# Patient Record
Sex: FEMALE | URBAN - NONMETROPOLITAN AREA
[De-identification: names, ages, dates, MRNs, and addresses within clinical notes are randomized per-mention and may not be internally consistent; named-entity substitution may affect disease eponyms.]

---

## 2024-09-19 ENCOUNTER — PREP FOR PROCEDURE (OUTPATIENT)
Dept: OPERATING ROOM | Facility: HOSPITAL | Age: 69
End: 2024-09-19

## 2024-09-19 DIAGNOSIS — H25.811 COMBINED FORMS OF AGE-RELATED CATARACT OF RIGHT EYE: Primary | ICD-10-CM

## 2024-09-19 RX ORDER — PREDNISOLONE ACETATE 10 MG/ML
1 SUSPENSION/ DROPS OPHTHALMIC ONCE
OUTPATIENT
Start: 2024-09-19 | End: 2024-09-19

## 2024-09-19 RX ORDER — POVIDONE-IODINE 5 %
SOLUTION, NON-ORAL OPHTHALMIC (EYE) ONCE
OUTPATIENT
Start: 2024-09-19 | End: 2024-09-19

## 2024-09-19 RX ORDER — TETRACAINE HYDROCHLORIDE 5 MG/ML
1 SOLUTION OPHTHALMIC ONCE
OUTPATIENT
Start: 2024-09-19 | End: 2024-09-19

## 2024-09-19 RX ORDER — SODIUM CHLORIDE, SODIUM LACTATE, POTASSIUM CHLORIDE, CALCIUM CHLORIDE 600; 310; 30; 20 MG/100ML; MG/100ML; MG/100ML; MG/100ML
20 INJECTION, SOLUTION INTRAVENOUS CONTINUOUS
OUTPATIENT
Start: 2024-09-19

## 2024-09-19 RX ORDER — KETOROLAC TROMETHAMINE 5 MG/ML
1 SOLUTION OPHTHALMIC
OUTPATIENT
Start: 2024-09-19 | End: 2024-09-19

## 2024-09-25 ENCOUNTER — ANESTHESIA EVENT (OUTPATIENT)
Dept: OPERATING ROOM | Facility: HOSPITAL | Age: 69
End: 2024-09-25
Payer: MEDICARE

## 2024-09-26 ENCOUNTER — ANESTHESIA (OUTPATIENT)
Dept: OPERATING ROOM | Facility: HOSPITAL | Age: 69
End: 2024-09-26
Payer: MEDICARE

## 2024-09-26 ENCOUNTER — HOSPITAL ENCOUNTER (OUTPATIENT)
Facility: HOSPITAL | Age: 69
Setting detail: OUTPATIENT SURGERY
Discharge: HOME | End: 2024-09-26
Attending: OPHTHALMOLOGY | Admitting: OPHTHALMOLOGY
Payer: MEDICARE

## 2024-09-26 VITALS
RESPIRATION RATE: 18 BRPM | BODY MASS INDEX: 40.47 KG/M2 | DIASTOLIC BLOOD PRESSURE: 57 MMHG | HEART RATE: 66 BPM | WEIGHT: 206.13 LBS | SYSTOLIC BLOOD PRESSURE: 145 MMHG | OXYGEN SATURATION: 95 % | HEIGHT: 60 IN | TEMPERATURE: 96.8 F

## 2024-09-26 PROBLEM — E66.9 OBESITY: Status: ACTIVE | Noted: 2024-09-26

## 2024-09-26 PROBLEM — E03.9 HYPOTHYROIDISM: Status: ACTIVE | Noted: 2024-09-26

## 2024-09-26 PROBLEM — I25.10 CAD (CORONARY ARTERY DISEASE): Status: ACTIVE | Noted: 2024-09-26

## 2024-09-26 PROBLEM — Z95.5 STENTED CORONARY ARTERY: Status: ACTIVE | Noted: 2024-09-26

## 2024-09-26 PROCEDURE — 2780000003 HC OR 278 NO HCPCS: Performed by: OPHTHALMOLOGY

## 2024-09-26 PROCEDURE — 3700000002 HC GENERAL ANESTHESIA TIME - EACH INCREMENTAL 1 MINUTE: Performed by: OPHTHALMOLOGY

## 2024-09-26 PROCEDURE — 2500000005 HC RX 250 GENERAL PHARMACY W/O HCPCS: Performed by: OPHTHALMOLOGY

## 2024-09-26 PROCEDURE — 2500000005 HC RX 250 GENERAL PHARMACY W/O HCPCS: Performed by: ANESTHESIOLOGY

## 2024-09-26 PROCEDURE — 2500000001 HC RX 250 WO HCPCS SELF ADMINISTERED DRUGS (ALT 637 FOR MEDICARE OP): Performed by: OPHTHALMOLOGY

## 2024-09-26 PROCEDURE — 2500000004 HC RX 250 GENERAL PHARMACY W/ HCPCS (ALT 636 FOR OP/ED): Performed by: ANESTHESIOLOGY

## 2024-09-26 PROCEDURE — 2720000007 HC OR 272 NO HCPCS: Performed by: OPHTHALMOLOGY

## 2024-09-26 PROCEDURE — 7100000010 HC PHASE TWO TIME - EACH INCREMENTAL 1 MINUTE: Performed by: OPHTHALMOLOGY

## 2024-09-26 PROCEDURE — 2500000004 HC RX 250 GENERAL PHARMACY W/ HCPCS (ALT 636 FOR OP/ED): Performed by: OPHTHALMOLOGY

## 2024-09-26 PROCEDURE — 7100000009 HC PHASE TWO TIME - INITIAL BASE CHARGE: Performed by: OPHTHALMOLOGY

## 2024-09-26 PROCEDURE — 3600000003 HC OR TIME - INITIAL BASE CHARGE - PROCEDURE LEVEL THREE: Performed by: OPHTHALMOLOGY

## 2024-09-26 PROCEDURE — 3700000001 HC GENERAL ANESTHESIA TIME - INITIAL BASE CHARGE: Performed by: OPHTHALMOLOGY

## 2024-09-26 PROCEDURE — 3600000008 HC OR TIME - EACH INCREMENTAL 1 MINUTE - PROCEDURE LEVEL THREE: Performed by: OPHTHALMOLOGY

## 2024-09-26 DEVICE — STERILE UV ABSORBING HYDROPHOBIC ACRYLIC FOLDABLE ASPHERIC POSTERIOR CHAMBER INTRAOCULAR LENS WITH THE AUTONOME™ AUTOMATED PRE-LOADED DELIVERY SYSTEM
Type: IMPLANTABLE DEVICE | Site: EYE | Status: FUNCTIONAL
Brand: CLAREON™

## 2024-09-26 RX ORDER — PREDNISOLONE ACETATE 10 MG/ML
1 SUSPENSION/ DROPS OPHTHALMIC ONCE
Status: COMPLETED | OUTPATIENT
Start: 2024-09-26 | End: 2024-09-26

## 2024-09-26 RX ORDER — TETRACAINE HYDROCHLORIDE 5 MG/ML
1 SOLUTION OPHTHALMIC ONCE
Status: COMPLETED | OUTPATIENT
Start: 2024-09-26 | End: 2024-09-26

## 2024-09-26 RX ORDER — LIDOCAINE HYDROCHLORIDE 10 MG/ML
INJECTION, SOLUTION EPIDURAL; INFILTRATION; INTRACAUDAL; PERINEURAL AS NEEDED
Status: DISCONTINUED | OUTPATIENT
Start: 2024-09-26 | End: 2024-09-26

## 2024-09-26 RX ORDER — PROPOFOL 10 MG/ML
INJECTION, EMULSION INTRAVENOUS AS NEEDED
Status: DISCONTINUED | OUTPATIENT
Start: 2024-09-26 | End: 2024-09-26

## 2024-09-26 RX ORDER — POVIDONE-IODINE 5 %
SOLUTION, NON-ORAL OPHTHALMIC (EYE) ONCE
Status: COMPLETED | OUTPATIENT
Start: 2024-09-26 | End: 2024-09-26

## 2024-09-26 RX ORDER — SODIUM CHLORIDE, SODIUM LACTATE, POTASSIUM CHLORIDE, CALCIUM CHLORIDE 600; 310; 30; 20 MG/100ML; MG/100ML; MG/100ML; MG/100ML
20 INJECTION, SOLUTION INTRAVENOUS CONTINUOUS
Status: DISCONTINUED | OUTPATIENT
Start: 2024-09-26 | End: 2024-09-26 | Stop reason: HOSPADM

## 2024-09-26 RX ORDER — ASPIRIN 81 MG/1
81 TABLET ORAL DAILY
COMMUNITY

## 2024-09-26 RX ORDER — SODIUM CHLORIDE, SODIUM LACTATE, POTASSIUM CHLORIDE, CALCIUM CHLORIDE 600; 310; 30; 20 MG/100ML; MG/100ML; MG/100ML; MG/100ML
100 INJECTION, SOLUTION INTRAVENOUS CONTINUOUS
Status: DISCONTINUED | OUTPATIENT
Start: 2024-09-26 | End: 2024-09-26 | Stop reason: HOSPADM

## 2024-09-26 RX ORDER — MIDAZOLAM HYDROCHLORIDE 1 MG/ML
INJECTION INTRAMUSCULAR; INTRAVENOUS AS NEEDED
Status: DISCONTINUED | OUTPATIENT
Start: 2024-09-26 | End: 2024-09-26

## 2024-09-26 RX ORDER — KETOROLAC TROMETHAMINE 5 MG/ML
1 SOLUTION OPHTHALMIC
Status: COMPLETED | OUTPATIENT
Start: 2024-09-26 | End: 2024-09-26

## 2024-09-26 RX ORDER — LIDOCAINE HYDROCHLORIDE AND EPINEPHRINE 10; 10 MG/ML; UG/ML
INJECTION, SOLUTION INFILTRATION; PERINEURAL AS NEEDED
Status: DISCONTINUED | OUTPATIENT
Start: 2024-09-26 | End: 2024-09-26 | Stop reason: HOSPADM

## 2024-09-26 RX ORDER — LEVOTHYROXINE SODIUM 25 UG/1
25 TABLET ORAL DAILY
COMMUNITY

## 2024-09-26 RX ADMIN — KETOROLAC TROMETHAMINE 1 DROP: 5 SOLUTION/ DROPS OPHTHALMIC at 09:01

## 2024-09-26 RX ADMIN — POLYVINYL ALCOHOL, POVIDONE 1 DROP: 14; 6 SOLUTION/ DROPS OPHTHALMIC at 08:47

## 2024-09-26 RX ADMIN — PHENYLEPHRINE HYDROCHLORIDE 1 DROP: 100 SOLUTION/ DROPS OPHTHALMIC at 08:46

## 2024-09-26 RX ADMIN — KETOROLAC TROMETHAMINE 1 DROP: 5 SOLUTION/ DROPS OPHTHALMIC at 08:30

## 2024-09-26 RX ADMIN — POLYVINYL ALCOHOL, POVIDONE 1 DROP: 14; 6 SOLUTION/ DROPS OPHTHALMIC at 08:41

## 2024-09-26 RX ADMIN — KETOROLAC TROMETHAMINE 1 DROP: 5 SOLUTION/ DROPS OPHTHALMIC at 08:41

## 2024-09-26 RX ADMIN — PHENYLEPHRINE HYDROCHLORIDE 1 DROP: 100 SOLUTION/ DROPS OPHTHALMIC at 09:01

## 2024-09-26 RX ADMIN — POLYVINYL ALCOHOL, POVIDONE 1 DROP: 14; 6 SOLUTION/ DROPS OPHTHALMIC at 08:30

## 2024-09-26 RX ADMIN — SODIUM CHLORIDE, POTASSIUM CHLORIDE, SODIUM LACTATE AND CALCIUM CHLORIDE 20 ML/HR: 600; 310; 30; 20 INJECTION, SOLUTION INTRAVENOUS at 08:00

## 2024-09-26 RX ADMIN — POVIDONE-IODINE 1 APPLICATION: 5 SOLUTION OPHTHALMIC at 08:31

## 2024-09-26 RX ADMIN — POLYVINYL ALCOHOL, POVIDONE 1 DROP: 14; 6 SOLUTION/ DROPS OPHTHALMIC at 09:01

## 2024-09-26 RX ADMIN — PHENYLEPHRINE HYDROCHLORIDE 1 DROP: 100 SOLUTION/ DROPS OPHTHALMIC at 08:30

## 2024-09-26 RX ADMIN — TETRACAINE HYDROCHLORIDE 1 DROP: 5 SOLUTION OPHTHALMIC at 08:30

## 2024-09-26 RX ADMIN — PHENYLEPHRINE HYDROCHLORIDE 1 DROP: 100 SOLUTION/ DROPS OPHTHALMIC at 08:41

## 2024-09-26 RX ADMIN — KETOROLAC TROMETHAMINE 1 DROP: 5 SOLUTION/ DROPS OPHTHALMIC at 08:46

## 2024-09-26 SDOH — HEALTH STABILITY: MENTAL HEALTH: CURRENT SMOKER: 0

## 2024-09-26 ASSESSMENT — COLUMBIA-SUICIDE SEVERITY RATING SCALE - C-SSRS
2. HAVE YOU ACTUALLY HAD ANY THOUGHTS OF KILLING YOURSELF?: NO
1. IN THE PAST MONTH, HAVE YOU WISHED YOU WERE DEAD OR WISHED YOU COULD GO TO SLEEP AND NOT WAKE UP?: NO
6. HAVE YOU EVER DONE ANYTHING, STARTED TO DO ANYTHING, OR PREPARED TO DO ANYTHING TO END YOUR LIFE?: NO

## 2024-09-26 ASSESSMENT — PAIN - FUNCTIONAL ASSESSMENT: PAIN_FUNCTIONAL_ASSESSMENT: 0-10

## 2024-09-26 NOTE — DISCHARGE INSTRUCTIONS
Please see enclosed instructions from Dr. Ponce regarding eye drop schedule, restrictions and use of eye shield.    Please take bag with eye drops that were given to you today as well as ALL eye drops that you are using at home with you to your appointment tomorrow at Dr. Ponce's office.

## 2024-09-26 NOTE — H&P
H&P Notes  - documented in this encounter   Cristino Ponce MD - 09/16/2024 2:53 PM EDT  Formatting of this note is different from the original.  HISTORY AND PHYSICAL EXAMINATION    SERVICE DATE: 9/16/2024  SERVICE TIME: 2:53 PM    PRIMARY CARE PHYSICIAN: Guido Skaggs MD    REASON FOR VISIT:  Ashley Yeung is a 69 year old female who is being seen for Combined form age related cataract right eye    The patient has the following:  ACTIVE PROBLEM LIST  Hypothyroidism, Acquired  Chronic Right-Sided Low Back Pain Without Sciatica  Dean (Dyspnea On Exertion)  Chronic Pain of Right Hip  Pilar Cyst of Scalp  History of Coronary Artery Stent Placement  Macular Hole of Right Eye  Vitreomacular Traction, Left  Cortical Cataract of Both Eyes  Cataract, Nuclear Sclerotic, Both Eyes  Language Barrier Affecting Health Care  Coronary Artery Disease Involving Native Coronary Artery of Native Heart Without Angina Pectoris  Obesity, Class III, BMI >= 40  Lumbar Facet Arthropathy  Combined Form of Age-Related Cataract, Right Eye    SUBJECTIVE  CHIEF COMPLAINT: Combined form age related cataract right eye  Associated symptoms: Blurry vision, difficulty reading small print, difficulty watching television    PAST MEDICAL HISTORY  Diagnosis Date  Coronary artery disease  DEAN (dyspnea on exertion)  History of coronary artery stent placement  HLD (hyperlipidemia)  Hypothyroidism    PAST SURGICAL HISTORY  Procedure Laterality Date  PATIENT HAS A CORONARY ARTERY STENT    History reviewed. No pertinent family history.  SOCIAL HISTORY:  Social History    Tobacco Use  Smoking status: Never  Passive exposure: Never  Smokeless tobacco: Never  Vaping Use  Vaping status: Never Used  Substance Use Topics  Alcohol use: Never  Drug use: Never    MEDICATIONS:  Prior to Admission medications as of 9/16/24 1446  Medication Sig Last Dose Taking  levothyroxine (SYNTHROID) 50 mcg tablet Take 1 tablet by mouth once daily. Taking Yes  aspirin, enteric  coated (ADULT LOW DOSE ASPIRIN) 81 mg EC tablet Take 1 tablet by mouth once daily. Taking Yes  atorvastatin (LIPITOR) 20 mg tablet Take 1 tablet by mouth once daily. Taking Yes  fluorometholone (FML LIQUID FILM) 0.1 % ophthalmic suspension Use 1 Drop in both eyes three times a day.    No medication comments found.    CURRENT ALLERGIES: ALLERGIES  No Known Allergies    REVIEW OF SYSTEMS:  PAIN ASSESSMENT:  General: No weight loss, malaise or fevers.  Neuro: No Hx of stroke or seizures  Respiratory: No history of current cough or dyspnea, or pneumonia in the past 6 weeks. No history of respiratory/pulmonary symptoms or problems  Cardiovascular: CAD  GI: No history of GI symptoms or problems. No history of esophageal varices, recent ascites, or ETOH greater than 2 drinks per day.  : No history of UTI in past 6 weeks. No history of renal failure. Not currently on or requiring dialysis. No history of  symptoms or problems.  GYN: Negative for abnormal vaginal bleeding, abnormal vaginal discharge.  Pregnancy: Denies  Endocrine: No history of diabetes. Has not taken steroids within the past 30 days. No history of endocrinological symptoms or problems.  Hematology: No history of bleeding or clotting disorder. Pt is not taking anti-coagulation or platelet medications. No history of hematological symptoms or problems.  Oncology: No history of CA metastasis, chemo within 30 days, or radiotherapy within 90 days. Has not lost 10% of body wt in 6 months. No history of oncological symptoms or problems.  Psych: No history of psychiatric symptoms or problems.  Musculoskeletal: Negative for joint pain or swelling, back pain or muscle pain.  Skin: Negative for lesions, rash and itching.    PHYSICAL EXAM:  VITALS:  /72  Pulse 62        General: Alert and oriented  Skin: Normal color, no rash, no lesions.  HEENT: EOM, pupils equal, round and reactive.  Cardiovascular: Normal S1 & S2, no rubs, murmurs or gallops. No JVD. Pulse  regular.  Lungs: Normal breath sounds, no wheezes or crackles.  Abdomen: Soft, non-tender, no rigidity.  Extremities: No deformity, no edema or tenderness, no joint swelling or clubbing.  Neurological: Normal cognition and motor skills.  Pulses: Carotid and radial pulses normal +2.    Diagnostic tests reviewed for today's visit:  No new labs or tests    ASSESSMENT  Medication and Non-Pharmacologic VTE Prophylaxis/Anticoagulants      VTE Prophylaxis: VTE prophylaxis appropriate    Impression: There is no known pertinent medical condition which may affect harriet-operative course      [unfilled]  Clinical Risk Factors for Possible Cardiac Complications:  None    Patient is scheduled for a low-risk procedure.    FUNCTIONAL STATUS: Walk indoors, such as around the house (1.75 METs)  Do light work around the house, such as dusting or washing dishes (2.70 METs)  Take care of self, that is eating, dressing, bathing, using the toilet (2.75 METs)    Functional Class (NYHA): N/A    [employer]: Not obtained    PLAN  CONSULTS:  Patient does not require consults for optimization at this time.    The Following Tests/Procedures Have Been Initiated:  None    Instructions Given to Patient:  Patient given verbal and written preop instructions and voices comprehension and compliance.    SIGNATURE: Cristino Ponce MD PATIENT NAME: Ashley Yeung  DATE: September 16, 2024 MRN: [id number]  TIME: 2:53 PM PAGER/CONTACT #:    Electronically signed by Cristino Ponce MD at 09/16/2024 3:44 PM EDT   Source Comments  - [facility]   In the event this information is protected by the Federal Confidentiality of Alcohol and Drug Abuse Patient Records regulations: This information has been disclosed to you from records protected by Federal confidentiality rules (42 CFR Part 2). The Federal rules prohibit you from making any further disclosure of this information unless further disclosure is expressly permitted by the written consent of  the person to whom it pertains or as otherwise permitted by 42 CFR Part 2. A general authorization for the release of medical or other information is NOT sufficient for this purpose. The Federal rules restrict any use of the information to criminally investigate or prosecute any alcohol or drug abuse patient.  Reason for Visit    Reason for Visit -  Reason Comments   Blurred Vision Both Eyes     Difficulty Reading Both Eyes       Encounter Details    Encounter Details  Date Type Department Care Team (Latest Contact Info) Description   09/16/2024 2:00 PM EDT Office Visit [facility]   [address] [place] [address]   [phone]  Cristino Ponce MD   [address] [place] [address]   [phone] (Work)   [phone] (Fax)  Combined form of age-related cataract, right eye (Primary Dx);  Combined form of age-related cataract, left eye;  Macular hole of right eye;  Epiretinal membrane (ERM) of right eye;  Vitreomacular traction, left;  Hypothyroidism, acquired;  Hypercholesteremia;  Coronary artery disease involving native coronary artery of native heart without angina pectoris     Social History  - documented as of this encounter   Social History  Tobacco Use Types Packs/Day Years Used Date   Smoking Tobacco: Never           Passive Smoke Exposure: Never           Smokeless Tobacco: Never             Social History  Alcohol Use Standard Drinks/Week Comments   Never 0 (1 standard drink = 0.6 oz pure alcohol)       Social History  AUDIT-C Answer Date Recorded   Q1: How often do you have a drink containing alcohol? Never 08/22/2024   Q2: How many drinks containing alcohol do you have on a typical day when you are drinking? Patient does not drink 08/22/2024   Q3: How often do you have six or more drinks on one occasion? Never 08/22/2024     Social History  PHQ-2 Answer Date Recorded   PHQ-2 score 0 08/22/2024     Social History  Exercise Vital Sign Answer Date Recorded   On average, how many days per  week do you engage in moderate to strenuous exercise (like a brisk walk)? 6 days 08/22/2024   On average, how many minutes do you engage in exercise at this level? 20 min 08/22/2024     Social History  Area Deprivation Index Answer Date Recorded   National Score (1-100), lower number is lower risk 46 06/18/2024   State Score (1-10), lower number is lower risk 2 06/18/2024   Data from: https://[email or web address] Last address used for calculation [address] 06/18/2024     Social History  Sex and Gender Information Value Date Recorded   Sex Assigned at Birth Not on file     Gender Identity Not on file     Sexual Orientation Not on file       Last Filed Vital Signs  - documented in this encounter   Last Filed Vital Signs  Vital Sign Reading Time Taken Comments   Blood Pressure 137/72 09/16/2024 2:25 PM EDT     Pulse 62 09/16/2024 2:25 PM EDT     Temperature - -     Respiratory Rate - -     Oxygen Saturation - -     Inhaled Oxygen Concentration - -     Weight - -     Height - -     Body Mass Index - -       Patient Instructions  - documented in this encounter   Patient Instructions  Cristino Ponce MD - 09/16/2024 2:51 PM EDT   Formatting of this note is different from the original.  Start  Current Ophthalmic Meds        fluorometholone (FML LIQUID FILM) 0.1 % ophthalmic suspension Use 1 Drop in both eyes three times a day.    Systane Complete Artificial Tears - Use 1 Drop into both eyes three times a day.    Cataract surgery right eye is scheduled on 9/26/24 at [facility]    If you have any questions please contact our office at [phone].  After office hours or on the weekend, please call Dr. Ponce on his cell phone at [phone].    Electronically signed by Cristino Ponce MD at 09/16/2024 2:51 PM EDT     Ordered Prescriptions  - documented in this encounter  Reconcile with Patient's Chart  Ordered Prescriptions  Prescription Sig Dispensed Refills Start Date End Date    fluorometholone (FML LIQUID FILM) 0.1 % ophthalmic suspension  Use 1 Drop in both eyes three times a day. 5 mL  2 09/16/2024       Progress Notes  - documented in this encounter   Cristino Ponce MD - 09/16/2024 2:32 PM EDT  Formatting of this note is different from the original.  ASSESSMENT/PLAN:  1. Combined form of age-related cataract, right eye - ICD9: 366.19, ICD10: H25.811 (primary diagnosis)  - Patient inform ed prognosis guarded do to macular hole right eye.    Ashley Yeung has confirmed that she is no longer able to function adequately on a day-to-day basis because of her current visual condition.    Further, it is my medical opinion that the cataract is the primary cause, or at least a significant cause of her visual dysfunction. Other eye diseases have been ruled out as primary cause of decreased vision. It is my expectation that visual function and quality of life will improve significantly with cataract extraction and intraocular lens implantation.    The risks, benefits, alternatives, and complications of cataract surgery with intraocular lens implantation were discussed with Ashley N Hubersarwat in detail. Ashley Yeung appeared to understand and asked that I proceed with plans for cataract surgery.    A complete, comprehensive eye examination and biometry has been performed on Ashley Yeung.    Upon eye examination, patient was found to have a visually significant cataract both eyes. Discussed cataract surgery with patient and different intraocular lens implant options with patient: basic monofocal intraocular lens implant, Toric intraocular lens implant, and presbyopia correction intraocular lens implant. In my medical opinion, based on medical history and ocular examination, cataract surgery with intraocular lens implant will correct patient's vision and improve quality of patient's daily living activities. Patient wishes to have traditional cataract surgery with basic  intraocular lens right eye scheduled on 9/27/24. Patient wishes to have cataract surgery with the option stated above. Patient understands that an intraocular lens implant does not necessarily replace the need for glasses. Patient understands that it is impossible for the surgeon to inform him/her of every possible complication that may occur. The surgeon has answered all of the patient's questions. Patient understands that if he/she has a mature or dense cataract, pseudoexfoliation cataract, or history of use of Flomax, he/she may require the use of Maluyugin Ring and/or Vision Blue during surgery. Patient understands the risks, benefits, and alternatives to surgery.    Cataract Presurgical Documentation    Cataract: Right eye (OD)    Current Visual Acuity  Right Eye Distance SC 20/100  Left Eye Distance SC 20/40    Visual Function: Ashley Yeung states that the decline in vision from the cataract impedes her abilities as listed in the HPI, as well as other activities of daily living.    Ashley Machadolasarwat has confirmed that she is no longer able to function adequately on a day-to-day basis because of her current visual condition.    Further, it is my medical opinion that the cataract is the primary cause, or at least a significantly contributory cause of her visual dysfunction. With uncomplicated cataract surgery and lens implantation, it is my expectation that her visual function and quality of life will improve, significantly.    The risks, benefits, alternatives, personnel and complications of cataract surgery with lens implantation were discussed with Ashley Yeung in detail. she appeared to understand and asked that I proceed with plans for surgery.    PHYSICAL EXAM:    Vital Signs:  Blood pressure 137/72, pulse 62.    Respiratory:  Normal breath sounds, no wheezing.    CARD:  Normal heart sounds 1 & 2, normal sinus rhythm.    Start  Current Ophthalmic Meds        fluorometholone (FML LIQUID  FILM) 0.1 % ophthalmic suspension Use 1 Drop in both eyes three times a day.    Systane Complete Artificial Tears - Use 1 Drop into both eyes three times a day.    2. Combined form of age-related cataract, left eye - ICD9: 366.19, ICD10: H25.812  - Plan cataract surgery left eye after right eye is completed and stable    3. Macular hole of right eye - ICD9: 362.54, ICD10: H35.341  4. Epiretinal membrane (ERM) of right eye - ICD9: 362.56, ICD10: H35.371  - Patient is scheduled for Pars plana vitrectomy 10/28/24 right eye with Dr. Bravo    5. Vitreomacular traction, left - ICD9: 379.27, ICD10: H43.822  - Continue to monitor with Dr. Bravo    6. Hypothyroidism, acquired - ICD9: 244.9, ICD10: E03.9  7. Hypercholesteremia - ICD9: 272.0, ICD10: E78.00  - Continue to monitor with PCP    I have confirmed and edited as necessary the relevant HPI, ophthalmic history, ROS, and the neuro exam findings as obtained by others. I have seen and examined Ashley Yeung.  I have discussed the case and the management of this patient's care with the Resident/Fellow, if applicable. I also have reviewed and agree with the assessment and plan as stated above and agree with all of its relevant components.        Electronically signed by Cristino Ponce MD at 09/16/2024 3:44 PM EDT   Plan of Treatment  - documented as of this encounter   Plan of Treatment - Upcoming Encounters  Upcoming Encounters  Date Type Department Care Team (Latest Contact Info) Description   09/27/2024 8:15 AM EDT Office Visit [facility]hthalmology   [address] [place] [address]   [phone]  Cristino Ponce MD   [address] [place] [address]   [phone] (Work)   [phone] (Fax)  1 day po 1st re time per mkrosa elena   09/30/2024 1:40 PM EDT Appointment Radiology   [address] [place] [address]    Encounter for screening mammogram for breast cancer [Z12.31]   10/15/2024 8:30 AM EDT Office Visit Pain Management   [address] [place] [address]   [phone]  Nicolette Parry MD   [address] [place] [address]   [phone] (Work)   [phone] (Fax)  4 week f/u   10/27/2024 Hospital Encounter Surgery Center   [address] [address]   [phone]  Golden Dominique MD   [address] [place] [address]   [phone] (Work)   [phone] (Fax)  Pilar cyst of scalp [L72.11]   10/28/2024 Hospital Encounter Admitting   [address] [place] [address]  Lebron Bravo MD   [address] [place] [address]   [phone] (Work)   [phone] (Fax)  Macular hole of right eye [H35.341]   11/22/2024 4:00 PM EST Office Visit Family Medicine   [address] [place] [id number] [phone]  Guido Skaggs MD   [address] [place], [place] [id number] [phone] (Work)   [phone] (Fax)  Return in about 3 months (around 11/22/2024) for SOB 40 mins.   11/25/2024 2:20 PM EST Office Visit [facility]   [address]   [place], [place] [id number] [phone]  Alice Anderson MD   [address]   [address] [id number] [phone] (Work)   [phone] (Fax)  Referral from [facility] Kirby for ACE and hx of stent /sab     Plan of Treatment - Scheduled Procedures  Scheduled Procedures  Name Priority Associated Diagnoses Date/Time   SURGERY AT NON-[facility] [place] operated by [facility] FAC[facility]   Combined forms of age-related cataract of right eye  09/26/2024 12:00 PM EDT   EXCISION SOFT TISSUE TUMOR SUB-Q OF SCALP = / > 2CM   Pilar cyst of scalp      VITRECTOMY MECHANICAL 25 G PARS PLANA APPROACH   Macular hole of right eye        Procedures  - documented in this encounter   Procedures  Procedure Name Priority Date/Time Associated Diagnosis Comments   FUNDUS PHOTOS OU (BOTH EYES) Routine 09/16/2024 3:43 PM EDT Macular hole of right eye   Epiretinal membrane (ERM) of right eye  Results for this procedure are in the results section.    IOL BIOMETRY W/ IOL CALC OU (BOTH EYES) Routine  09/16/2024 2:39 PM EDT Combined form of age-related cataract, right eye   Combined form of age-related cataract, left eye  Results for this procedure are in the results section.      Imaging Results  - documented in this encounter   Table of Contents for Imaging Results   FUNDUS PHOTOS OU (BOTH EYES) (09/16/2024 3:43 PM EDT)   IOL BIOMETRY W/ IOL CALC OU (BOTH EYES) (09/16/2024 2:39 PM EDT)      FUNDUS PHOTOS OU (BOTH EYES) (09/16/2024 3:43 PM EDT)  Imaging Results - FUNDUS PHOTOS OU (BOTH EYES) (09/16/2024 3:43 PM EDT)  Anatomical Region Laterality Modality       Other     Imaging Results - FUNDUS PHOTOS OU (BOTH EYES) (09/16/2024 3:43 PM EDT)  Narrative   09/16/2024 3:43 PM EDT   Date of Procedure  9/16/2024.    Technician Information  Imaging Technician: [email or web address].    C/D Ratio  Right Eye  0.35.    Left Eye  0.35.    Disc  Right Eye  Normal.    Left Eye  Normal.    Macula  Right Eye  Macular hole.    Left Eye  (Vitreomacular traction left eye).    Periphery  Right Eye  Normal.    Left Eye  Normal.       Imaging Results - FUNDUS PHOTOS OU (BOTH EYES) (09/16/2024 3:43 PM EDT)  Authorizing Provider Result Type   Cristino Ponce MD OPHTHALMOLOGY     Associated Images       9/16/2024  FUNDUS PHOTOS OU (BOTH EYES)   Back to top of Imaging Results       IOL BIOMETRY W/ IOL CALC OU (BOTH EYES) (09/16/2024 2:39 PM EDT)  Imaging Results - IOL BIOMETRY W/ IOL CALC OU (BOTH EYES) (09/16/2024 2:39 PM EDT)  Anatomical Region Laterality Modality       Other     Imaging Results - IOL BIOMETRY W/ IOL CALC OU (BOTH EYES) (09/16/2024 2:39 PM EDT)  Narrative   09/16/2024 2:39 PM EDT   Date of Procedure  9/16/2024.    Notes  Measurements only - see Procedure Record under Scanned Documents for  signed results.    LENSTAR  Right eye:  AL 22.85 ACD 2.99 WTW 12.10  Left eye:     AL 23.04 ACD 3.09 WTW 12.33        Imaging Results - IOL BIOMETRY W/ IOL CALC OU (BOTH EYES) (09/16/2024 2:39 PM EDT)  Authorizing Provider Result Type   Cristino OLIVAREZ  Kris GRISSOM OPHTHALMOLOGY     Associated Images       9/16/2024  IOL BIOMETRY W/ IOL CALC OU (BOTH EYES)   Back to top of Imaging Results  Visit Diagnoses  - documented in this encounter   Visit Diagnoses  Diagnosis   Combined form of age-related cataract, right eye - Primary    Combined form of age-related cataract, left eye    Macular hole of right eye   Macular cyst, hole, or pseudohole of retina    Epiretinal membrane (ERM) of right eye    Vitreomacular traction, left    Hypothyroidism, acquired   Unspecified hypothyroidism    Hypercholesteremia   Pure hypercholesterolemia    Coronary artery disease involving native coronary artery of native heart without angina pectoris      Administered Medications  - documented in this encounter   Administered Medications - Inactive Administered Medications - up to 3 most recent administrations  Inactive Administered Medications - up to 3 most recent administrations  Medication Order MAR Action Action Date Dose Rate Site   PHENYLephrine 2.5 % 1 Drop (AK-DILATE, RIDDHI-SYNEPHRINE)   1 Drop, BOTH EYES, AS DIRECTED, Starting on Mon 9/16/24 at 1430, Until Tue 9/17/24 at 0229, Administer for dilation PROTECT FROM LIGHT  Given 09/16/2024 2:30 PM EDT 1 Drop             proparacaine 0.5 % 1 Drop (ALCAINE)   1 Drop, BOTH EYES, AS DIRECTED, Starting on Mon 9/16/24 at 1430, Until Tue 9/17/24 at 0229, Administer for pneumo tonometry, tonopen tonometry, or pachymetry. In the event of a proparacaine shortage, administer tetracaine 0.5% ophthalmic drops 1 drop in the left eye as directed for pneumo tonometry, tonopen tonometry, or pachymetry  Given 09/16/2024 2:30 PM EDT 1 Drop             tropicamide 1 % 1 Drop (MYDRIACYL)   1 Drop, BOTH EYES, AS DIRECTED, Starting on Mon 9/16/24 at 1430, Until Tue 9/17/24 at 0229, Administer for dilation  Given 09/16/2024 2:30 PM EDT 1 Drop               Eye Exam    Eye Exam - Visual Acuity (Snellen - Linear)  Visual Acuity (Snellen - Linear)    Right eye Left  eye   Dist sc 20/100 20/40 +1   Near cc J16 J5     Eye Exam - Tonometry (Applanation, 2:46 PM)  Tonometry (Applanation, 2:46 PM)    Right eye Left eye   Pressure 15 15     Eye Exam - Pupils  Pupils    Pupils Dark Light Shape React APD   Right eye PERRL 3 2 Round +2 None   Left eye PERRL 3 2 Round +2 None     Eye Exam - Visual Fields (Counting fingers)  Visual Fields (Counting fingers)    Right eye Left eye     Full Full     Eye Exam - Extraocular Movement  Extraocular Movement    Right eye Left eye     Full Full     Eye Exam - Neuro/Psych  Neuro/Psych  Oriented x3: Yes   Mood/Affect: Normal     Eye Exam - Dilation  Dilation  Both eyes: 2.5% Phenylephrine/1.0% Mydriacyl @ 2:31 PM     Eye Exam - External Exam  External Exam    Right eye Left eye   External Normal including orbits and preauricular lymph nodes Normal including orbits and preauricular lymph nodes     Eye Exam - Slit Lamp Exam  Slit Lamp Exam    Right eye Left eye   Lids/Lashes Normal lids, lashes, lacrimal glands, and lacrimal drainage Normal lids, lashes, lacrimal glands, and lacrimal drainage   Conjunctiva/Sclera Pterygium at 9 o'clock Pterygium at 9 o'clock   Cornea pterygium temporal <1 mm; normal otherwise Normal epithelium, stroma, endothelium, and tear film   Anterior Chamber Deep and quiet Deep and quiet   Iris Round and reactive Round and reactive   Lens 2+ Nuclear sclerotic cataract, 2+ Cortical cataract 2+ Nuclear sclerotic cataract, 2+ Cortical cataract   Anterior Vitreous Vitreous syneresis, Posterior vitreous detachment Vitreous syneresis     Eye Exam - Fundus Exam  Fundus Exam    Right eye Left eye   Disc Normal Normal   C/D Ratio 0.35 0.35   Macula macular hole, ERM VMT   Vessels Normal course and caliber without hemes Normal course and caliber without hemes   Periphery Normal, flat and intact 360 (-)holes/tears/detachments Normal, flat and intact 360 (-)holes/tears/detachments     Care Teams  - documented as of this encounter   Care  Teams  Team Member Relationship Specialty Start Date End Date   Guido Skaggs MD   NPI: [id number]   [address] [place] [address]   [phone] (Work)   [phone] (Fax)  PCP - General Family Medicine 8/22/24

## 2024-09-26 NOTE — ANESTHESIA POSTPROCEDURE EVALUATION
Patient: Ashley Yeung    Procedure Summary       Date: 09/26/24 Room / Location: [facility] OR  / Virtual [facility] OR    Anesthesia Start: 0916 Anesthesia Stop: 0954    Procedure: Phacoemulsification Cataract with Insertion Intraocular Lens (Right: Eye) Diagnosis:       Combined forms of age-related cataract of right eye      (Combined forms of age-related cataract of right eye [H25.811])    Surgeons: Cristino Ponce MD Responsible Provider: Xavier Rosas MD PhD    Anesthesia Type: MAC ASA Status: 3            Anesthesia Type: MAC    Vitals Value Taken Time   /57 09/26/24 1000   Temp 36 °C (96.8 °F) 09/26/24 0953   Pulse 6 09/26/24 1000   Resp 18 09/26/24 1000   SpO2 95 % 09/26/24 1000       Anesthesia Post Evaluation    Patient location during evaluation: PACU  Patient participation: complete - patient participated  Level of consciousness: awake  Pain management: satisfactory to patient  Airway patency: patent  Cardiovascular status: hemodynamically stable  Respiratory status: spontaneous ventilation  Hydration status: euvolemic  Postoperative Nausea and Vomiting: none        No notable events documented.

## 2024-09-26 NOTE — OP NOTE
Phacoemulsification Cataract with Insertion Intraocular Lens (R) Operative Note     Date: 2024  OR Location: [facility] OR    Name: Ashley Yeung, : 1955, Age: 69 y.o., MRN: [id number], Sex: female    Diagnosis  Pre-op Diagnosis      * Combined forms of age-related cataract of right eye [H25.811] Post-op Diagnosis     * Combined forms of age-related cataract of right eye [H25.811]     Procedures  Phacoemulsification Cataract with Insertion Intraocular Lens  48747 - IA XCAPSL CTRC RMVL INSJ IO LENS PROSTH W/O ECP      Surgeons      * Cristino Ponce - Primary    Resident/Fellow/Other Assistant:  Surgeons and Role:  * No surgeons found with a matching role *    Procedure Summary  Anesthesia: Monitor Anesthesia Care  ASA: III  Anesthesia Staff: No anesthesia staff entered.  Estimated Blood Loss: none  Intra-op Medications:   Administrations occurring from 0850 to 0930 on 24:   Medication Name Total Dose   ketorolac (Acular) 0.5 % ophthalmic solution 1 drop 1 drop   lubricating eye drops ophthalmic solution 1 drop 1 drop   phenylephrine-tropicamide 10 %-1 % ophthalmic solution 1 drop 1 drop     Anesthesia Record        Intraprocedure I/O Totals       None      Specimen: No specimens collected     Staff:   Circulator: Danette Garcia Person: Candis Garcia Person: Kristian Ley Circulator: Mesha    Drains and/or Catheters: * None in log *    I have reviewed the images and report from the Ophthalmic Biometry 24 to determine the intraocular lens power calculation for the IOL lens implant.  I have interpreted and agree with the calculation of the IOL as listed below.    Implants:  Implants       Type Name Action Serial No.      Lens CLAREON UV IOL 23.5D Implanted [id number]        Findings: Combined Form Age Related Cataract Right Eye     Indications: Ashley Yeung is an 69 y.o. female who is having surgery for Combined forms of age-related cataract of right eye [H25.811]. Blurred vision for near  and distance right eye. Patient has difficulty seeing for reading and watching TV right eye.      The patient was seen in the preoperative area. The risks, benefits, complications, treatment options, non-operative alternatives, expected recovery and outcomes were discussed with the patient. The possibilities of reaction to medication, pulmonary aspiration, injury to surrounding structures, bleeding, recurrent infection, the need for additional procedures, failure to diagnose a condition, and creating a complication requiring transfusion or operation were discussed with the patient. The patient concurred with the proposed plan, giving informed consent.  The site of surgery was properly noted/marked if necessary per policy. The patient has been actively warmed in preoperative area. Preoperative antibiotics are not indicated. Venous thrombosis prophylaxis are not indicated.    Procedure Details: The patient was correctly identified and the patient's operative eye was marked with a marking pen and verified with the patient in the pre-operative area.   The operative eye was dilated in the preoperative area.  The patient was then taken to the operating room where timeout was performed before starting the procedure. Combined anesthesia  with intravenous sedation and topical tetracaine eyedrops were instilled into the right eye. A peribulbar block was given using 1% Lidocaine with Epinephrine. The operative eye  was prepped and draped in the standard sterile ophthalmic fashion in  preparation for ophthalmic surgery.  A Nickie wire speculum was then inserted between the eyelids of the right eye and the operating microscope was placed over the right eye.  A paracentesis incision was made approximately 30° away from the planned surgical incision site with the help of MVR blade.  1% lidocaine MPF with Phenylephrine 1.5% PF was injected into the anterior chamber through the paracentesis incision. A near limbal clear corneal  incision was fashioned in the temporal quadrant just outside the vascular arcade and Viscoat was injected into anterior chamber to firm the eye. A bent needle cystotome was used and Utrata forceps were utilized to create a continuous curvilinear capsulorrhexis.  BSS was injected beneath the anterior capsule to hydrodissect the nucleus from adjacent cortex and capsule.  The residual cortex was then aspirated with irrigation/aspiration handpiece. The posterior capsule was then polished with the help of soft irrigation-aspiration tip.  Provisc viscoelastic was then injected into the eye to reform the anterior chamber and to open the capsular bag.  The intraocular lens implant was taken from its sterile wrapping, inspected under the surgical microscope and found to be in good condition. The intraocular lens implant +23.5D was injected into the capsule bag. The Provisc was then aspirated from the anterior chamber and from behind the intraocular lens implant.  The anterior chamber was inflated with the help of BSS to moderate tension.  And the edges of the surgical incision were then hydrated with the help of BSS.  Vigamox was then injected into the anterior chamber and into the capsule bag through the paracentesis incision. There was scar tissue at the sight of the incision, a suture was placed between 8 o'clock to 10 o'clock using 10-0 Nylon suture. The surgical wound was then inspected and found to be watertight.  The wire speculum and drapes were then removed.  Pred Forte eyedrops, Acular eyedrops and Betadine 5% sterile ophthalmic solution were instilled in the conjunctival sac.     The patient tolerated the procedure well and was taken to recovery room in stable condition.    Complications:  None; patient tolerated the procedure well.    Disposition:  Home  Condition: stable     Attending Attestation: I performed the procedure.    Cristino Ponce  Phone Number: [phone]

## 2024-09-26 NOTE — ANESTHESIA PREPROCEDURE EVALUATION
Patient: Ashley Yeung    Procedure Information       Date/Time: 09/26/24 0850    Procedure: Phacoemulsification Cataract with Insertion Intraocular Lens (Right: Eye)    Location: [facility] OR  / Virtual [facility] OR    Surgeons: Cristino Ponce MD            Relevant Problems   No relevant active problems       Clinical information reviewed:   Tobacco  Allergies  Meds   Med Hx  Surg Hx   Fam Hx  Soc Hx        NPO Detail:  NPO/Void Status  Carbohydrate Drink Given Prior to Surgery? : N  Date of Last Liquid: 09/25/24  Time of Last Liquid: 2000  Date of Last Solid: 09/25/24  Time of Last Solid: 2000  Last Intake Type: Clear fluids  Time of Last Void: 0700         Physical Exam    Airway  Mallampati: III  TM distance: >3 FB     Cardiovascular   Rhythm: regular  Rate: normal     Dental - normal exam     Pulmonary - normal exam     Abdominal            Anesthesia Plan    History of general anesthesia?: no  History of complications of general anesthesia?: no    ASA 3     MAC     The patient is not a current smoker.  Patient did not smoke on day of procedure.    intravenous induction   Anesthetic plan and risks discussed with patient.

## (undated) DEVICE — Device